# Patient Record
Sex: FEMALE | Race: WHITE | NOT HISPANIC OR LATINO | URBAN - METROPOLITAN AREA
[De-identification: names, ages, dates, MRNs, and addresses within clinical notes are randomized per-mention and may not be internally consistent; named-entity substitution may affect disease eponyms.]

---

## 2018-05-11 ENCOUNTER — COMMUNICATION - HEALTHEAST (OUTPATIENT)
Dept: TELEHEALTH | Facility: CLINIC | Age: 28
End: 2018-05-11

## 2018-05-11 ENCOUNTER — OFFICE VISIT - HEALTHEAST (OUTPATIENT)
Dept: FAMILY MEDICINE | Facility: CLINIC | Age: 28
End: 2018-05-11

## 2018-05-11 DIAGNOSIS — F41.9 ANXIETY: ICD-10-CM

## 2018-05-11 DIAGNOSIS — N89.8 VAGINAL DISCHARGE: ICD-10-CM

## 2018-05-11 DIAGNOSIS — R03.0 ELEVATED BLOOD-PRESSURE READING WITHOUT DIAGNOSIS OF HYPERTENSION: ICD-10-CM

## 2018-05-11 DIAGNOSIS — L70.9 ACNE: ICD-10-CM

## 2018-05-11 DIAGNOSIS — Z00.00 HEALTH CARE MAINTENANCE: ICD-10-CM

## 2018-05-11 LAB
ALBUMIN SERPL-MCNC: 4.3 G/DL (ref 3.5–5)
ALP SERPL-CCNC: 44 U/L (ref 45–120)
ALT SERPL W P-5'-P-CCNC: <9 U/L (ref 0–45)
ANION GAP SERPL CALCULATED.3IONS-SCNC: 13 MMOL/L (ref 5–18)
AST SERPL W P-5'-P-CCNC: 14 U/L (ref 0–40)
BILIRUB SERPL-MCNC: 0.9 MG/DL (ref 0–1)
BUN SERPL-MCNC: 13 MG/DL (ref 8–22)
CALCIUM SERPL-MCNC: 9.8 MG/DL (ref 8.5–10.5)
CHLORIDE BLD-SCNC: 102 MMOL/L (ref 98–107)
CHOLEST SERPL-MCNC: 189 MG/DL
CLUE CELLS: NORMAL
CO2 SERPL-SCNC: 21 MMOL/L (ref 22–31)
CREAT SERPL-MCNC: 0.87 MG/DL (ref 0.6–1.1)
ERYTHROCYTE [DISTWIDTH] IN BLOOD BY AUTOMATED COUNT: 10.9 % (ref 11–14.5)
FASTING STATUS PATIENT QL REPORTED: YES
GFR SERPL CREATININE-BSD FRML MDRD: >60 ML/MIN/1.73M2
GLUCOSE BLD-MCNC: 86 MG/DL (ref 70–125)
HCT VFR BLD AUTO: 45.6 % (ref 35–47)
HDLC SERPL-MCNC: 67 MG/DL
HGB BLD-MCNC: 16.1 G/DL (ref 12–16)
LDLC SERPL CALC-MCNC: 100 MG/DL
MCH RBC QN AUTO: 31.8 PG (ref 27–34)
MCHC RBC AUTO-ENTMCNC: 35.4 G/DL (ref 32–36)
MCV RBC AUTO: 90 FL (ref 80–100)
PLATELET # BLD AUTO: 299 THOU/UL (ref 140–440)
PMV BLD AUTO: 7 FL (ref 7–10)
POTASSIUM BLD-SCNC: 4.3 MMOL/L (ref 3.5–5)
PROT SERPL-MCNC: 7.8 G/DL (ref 6–8)
RBC # BLD AUTO: 5.08 MILL/UL (ref 3.8–5.4)
SODIUM SERPL-SCNC: 136 MMOL/L (ref 136–145)
TRICHOMONAS, WET PREP: NORMAL
TRIGL SERPL-MCNC: 111 MG/DL
TSH SERPL DL<=0.005 MIU/L-ACNC: 2.75 UIU/ML (ref 0.3–5)
WBC: 5.3 THOU/UL (ref 4–11)
YEAST, WET PREP: NORMAL

## 2018-05-11 ASSESSMENT — MIFFLIN-ST. JEOR: SCORE: 1297.41

## 2018-05-14 LAB
25(OH)D3 SERPL-MCNC: 39.2 NG/ML (ref 30–80)
25(OH)D3 SERPL-MCNC: 39.2 NG/ML (ref 30–80)
C TRACH DNA SPEC QL PROBE+SIG AMP: NEGATIVE
N GONORRHOEA DNA SPEC QL NAA+PROBE: NEGATIVE

## 2018-06-19 ENCOUNTER — COMMUNICATION - HEALTHEAST (OUTPATIENT)
Dept: FAMILY MEDICINE | Facility: CLINIC | Age: 28
End: 2018-06-19

## 2018-06-20 ENCOUNTER — AMBULATORY - HEALTHEAST (OUTPATIENT)
Dept: FAMILY MEDICINE | Facility: CLINIC | Age: 28
End: 2018-06-20

## 2018-06-20 DIAGNOSIS — Z30.9 CONTRACEPTIVE MANAGEMENT: ICD-10-CM

## 2018-07-10 ENCOUNTER — COMMUNICATION - HEALTHEAST (OUTPATIENT)
Dept: ADMINISTRATIVE | Facility: CLINIC | Age: 28
End: 2018-07-10

## 2019-05-30 ENCOUNTER — COMMUNICATION - HEALTHEAST (OUTPATIENT)
Dept: FAMILY MEDICINE | Facility: CLINIC | Age: 29
End: 2019-05-30

## 2019-05-30 DIAGNOSIS — Z30.9 CONTRACEPTIVE MANAGEMENT: ICD-10-CM

## 2019-06-18 ENCOUNTER — OFFICE VISIT (OUTPATIENT)
Dept: FAMILY MEDICINE | Facility: CLINIC | Age: 29
End: 2019-06-18
Payer: COMMERCIAL

## 2019-06-18 VITALS — SYSTOLIC BLOOD PRESSURE: 133 MMHG | TEMPERATURE: 98.1 F | DIASTOLIC BLOOD PRESSURE: 85 MMHG

## 2019-06-18 DIAGNOSIS — Z71.84 TRAVEL ADVICE ENCOUNTER: Primary | ICD-10-CM

## 2019-06-18 DIAGNOSIS — Z23 NEED FOR VACCINATION: ICD-10-CM

## 2019-06-18 PROCEDURE — 90632 HEPA VACCINE ADULT IM: CPT | Mod: GA | Performed by: FAMILY MEDICINE

## 2019-06-18 PROCEDURE — 90471 IMMUNIZATION ADMIN: CPT | Mod: GA | Performed by: FAMILY MEDICINE

## 2019-06-18 PROCEDURE — 90472 IMMUNIZATION ADMIN EACH ADD: CPT | Mod: GA | Performed by: FAMILY MEDICINE

## 2019-06-18 PROCEDURE — 99402 PREV MED CNSL INDIV APPRX 30: CPT | Mod: 25 | Performed by: FAMILY MEDICINE

## 2019-06-18 PROCEDURE — 90691 TYPHOID VACCINE IM: CPT | Mod: GA | Performed by: FAMILY MEDICINE

## 2019-06-18 RX ORDER — DROSPIRENONE AND ETHINYL ESTRADIOL 0.02-3(28)
KIT ORAL
COMMUNITY
Start: 2019-06-03

## 2019-06-18 RX ORDER — AZITHROMYCIN 500 MG/1
TABLET, FILM COATED ORAL
Qty: 3 TABLET | Refills: 0 | Status: SHIPPED | OUTPATIENT
Start: 2019-06-18

## 2019-06-18 RX ORDER — ATOVAQUONE AND PROGUANIL HYDROCHLORIDE 250; 100 MG/1; MG/1
1 TABLET, FILM COATED ORAL DAILY
Qty: 19 TABLET | Refills: 0 | Status: SHIPPED | OUTPATIENT
Start: 2019-06-18

## 2019-06-18 NOTE — PROGRESS NOTES
Itinerary:  [facility]     Departure Date: 6/21/19    Return Date: 7/1/19    Length of Trip: 10 days    Purpose of Trip: pleasure     Urban/Rural: both    Accommodations: family home     IMMUNIZATION HISTORY  Have you received any immunizations within the past 4 weeks?  No  Have you ever fainted from having your blood drawn or from an injection?  No  Have you ever had a fever reaction to vaccination?  No  Have you ever had any bad reaction or side effect from any vaccination?  No  Have you ever had hepatitis A or B vaccine?  Yes  Do you live (or work closely) with anyone who has AIDS, an AIDS-like condition, any other immune disorder or who is on chemotherapy for cancer or a   family history of immunodeficiency?  No  Have you received any injection of immune globulin or any blood products during the past 12 months?  No    Patient roomed by DARIN Matos     Special medical concerns: none    /85   Temp 98.1  F (36.7  C) (Oral)   LMP 06/13/2019 (Approximate)   EXAM: deferred    Immunizations discussed include: Hepatitis A and Typhoid  Malaraia prophylaxis recommended: Malarone  Symptomatic treatment for traveler's diarrhea: azithromycin    ASSESSMENT/PLAN:    ICD-10-CM    1. Travel advice encounter Z71.89 azithromycin (ZITHROMAX) 500 MG tablet     atovaquone-proguanil (MALARONE) 250-100 MG tablet   2. Need for vaccination Z23 HEPA VACCINE ADULT IM     TYPHOID VACCINE, IM     ADMIN 1st VACCINE     EA ADD'L VACCINE     I have reviewed general recommendations for safe travel   including: food/water precautions, insect avoidance, safe sex   practices given high prevalence of HIV and other STDs,   roadway safety. Educational materials and [employer] report provided.    Total visit time 30 minutes with over 50% of time spent counseling patient.

## 2019-08-05 ENCOUNTER — COMMUNICATION - HEALTHEAST (OUTPATIENT)
Dept: FAMILY MEDICINE | Facility: CLINIC | Age: 29
End: 2019-08-05

## 2019-08-05 DIAGNOSIS — Z30.9 CONTRACEPTIVE MANAGEMENT: ICD-10-CM

## 2019-08-06 ENCOUNTER — COMMUNICATION - HEALTHEAST (OUTPATIENT)
Dept: FAMILY MEDICINE | Facility: CLINIC | Age: 29
End: 2019-08-06

## 2019-08-06 DIAGNOSIS — Z30.9 CONTRACEPTIVE MANAGEMENT: ICD-10-CM

## 2019-08-28 ENCOUNTER — OFFICE VISIT - HEALTHEAST (OUTPATIENT)
Dept: FAMILY MEDICINE | Facility: CLINIC | Age: 29
End: 2019-08-28

## 2019-08-28 DIAGNOSIS — F41.9 ANXIETY: ICD-10-CM

## 2019-08-28 DIAGNOSIS — Z12.4 SCREENING FOR CERVICAL CANCER: ICD-10-CM

## 2019-08-28 DIAGNOSIS — Z00.00 ROUTINE GENERAL MEDICAL EXAMINATION AT A HEALTH CARE FACILITY: ICD-10-CM

## 2019-08-28 DIAGNOSIS — G25.81 RESTLESS LEG SYNDROME: ICD-10-CM

## 2019-08-28 DIAGNOSIS — Z30.09 GENERAL COUNSELING FOR PRESCRIPTION OF ORAL CONTRACEPTIVES: ICD-10-CM

## 2019-08-28 LAB
ANION GAP SERPL CALCULATED.3IONS-SCNC: 11 MMOL/L (ref 5–18)
BUN SERPL-MCNC: 11 MG/DL (ref 8–22)
CALCIUM SERPL-MCNC: 9.9 MG/DL (ref 8.5–10.5)
CHLORIDE BLD-SCNC: 104 MMOL/L (ref 98–107)
CHOLEST SERPL-MCNC: 189 MG/DL
CO2 SERPL-SCNC: 23 MMOL/L (ref 22–31)
CREAT SERPL-MCNC: 0.83 MG/DL (ref 0.6–1.1)
ERYTHROCYTE [DISTWIDTH] IN BLOOD BY AUTOMATED COUNT: 10.2 % (ref 11–14.5)
FASTING STATUS PATIENT QL REPORTED: YES
FERRITIN SERPL-MCNC: 73 NG/ML (ref 10–130)
GFR SERPL CREATININE-BSD FRML MDRD: >60 ML/MIN/1.73M2
GLUCOSE BLD-MCNC: 83 MG/DL (ref 70–125)
HCT VFR BLD AUTO: 42.9 % (ref 35–47)
HDLC SERPL-MCNC: 68 MG/DL
HGB BLD-MCNC: 15.2 G/DL (ref 12–16)
HIV 1+2 AB+HIV1 P24 AG SERPL QL IA: NEGATIVE
LDLC SERPL CALC-MCNC: 104 MG/DL
MCH RBC QN AUTO: 31.6 PG (ref 27–34)
MCHC RBC AUTO-ENTMCNC: 35.4 G/DL (ref 32–36)
MCV RBC AUTO: 89 FL (ref 80–100)
PLATELET # BLD AUTO: 336 THOU/UL (ref 140–440)
PMV BLD AUTO: 6.7 FL (ref 7–10)
POTASSIUM BLD-SCNC: 4.3 MMOL/L (ref 3.5–5)
RBC # BLD AUTO: 4.8 MILL/UL (ref 3.8–5.4)
SODIUM SERPL-SCNC: 138 MMOL/L (ref 136–145)
TRIGL SERPL-MCNC: 83 MG/DL
TSH SERPL DL<=0.005 MIU/L-ACNC: 2.41 UIU/ML (ref 0.3–5)
WBC: 5.6 THOU/UL (ref 4–11)

## 2019-08-28 ASSESSMENT — MIFFLIN-ST. JEOR: SCORE: 1330.52

## 2019-08-29 LAB
BKR LAB AP ABNORMAL BLEEDING: NO
BKR LAB AP BIRTH CONTROL/HORMONES: NORMAL
BKR LAB AP CERVICAL APPEARANCE: NORMAL
BKR LAB AP GYN ADEQUACY: NORMAL
BKR LAB AP GYN INTERPRETATION: NORMAL
BKR LAB AP GYN OTHER FINDINGS: NORMAL
BKR LAB AP HPV REFLEX: NORMAL
BKR LAB AP LMP: NORMAL
BKR LAB AP PATIENT STATUS: NO
BKR LAB AP PREVIOUS ABNORMAL: NORMAL
BKR LAB AP PREVIOUS NORMAL: NORMAL
C TRACH DNA SPEC QL PROBE+SIG AMP: NEGATIVE
HIGH RISK?: NO
N GONORRHOEA DNA SPEC QL NAA+PROBE: NEGATIVE
PATH REPORT.COMMENTS IMP SPEC: NORMAL
RESULT FLAG (HE HISTORICAL CONVERSION): NORMAL

## 2019-11-27 ENCOUNTER — COMMUNICATION - HEALTHEAST (OUTPATIENT)
Dept: FAMILY MEDICINE | Facility: CLINIC | Age: 29
End: 2019-11-27

## 2020-01-09 ENCOUNTER — OFFICE VISIT - HEALTHEAST (OUTPATIENT)
Dept: FAMILY MEDICINE | Facility: CLINIC | Age: 30
End: 2020-01-09

## 2020-01-09 DIAGNOSIS — Z30.09 GENERAL COUNSELING FOR PRESCRIPTION OF ORAL CONTRACEPTIVES: ICD-10-CM

## 2020-03-10 ENCOUNTER — HEALTH MAINTENANCE LETTER (OUTPATIENT)
Age: 30
End: 2020-03-10

## 2020-05-10 ENCOUNTER — OFFICE VISIT - HEALTHEAST (OUTPATIENT)
Dept: FAMILY MEDICINE | Facility: CLINIC | Age: 30
End: 2020-05-10

## 2020-05-10 DIAGNOSIS — N39.0 ACUTE UTI (URINARY TRACT INFECTION): ICD-10-CM

## 2020-05-11 ENCOUNTER — COMMUNICATION - HEALTHEAST (OUTPATIENT)
Dept: FAMILY MEDICINE | Facility: CLINIC | Age: 30
End: 2020-05-11

## 2020-08-17 ENCOUNTER — COMMUNICATION - HEALTHEAST (OUTPATIENT)
Dept: FAMILY MEDICINE | Facility: CLINIC | Age: 30
End: 2020-08-17

## 2020-08-20 ENCOUNTER — OFFICE VISIT - HEALTHEAST (OUTPATIENT)
Dept: FAMILY MEDICINE | Facility: CLINIC | Age: 30
End: 2020-08-20

## 2020-08-20 ENCOUNTER — AMBULATORY - HEALTHEAST (OUTPATIENT)
Dept: NURSING | Facility: CLINIC | Age: 30
End: 2020-08-20

## 2020-08-20 DIAGNOSIS — J03.90 TONSILLITIS: ICD-10-CM

## 2020-08-20 DIAGNOSIS — Z20.822 EXPOSURE TO COVID-19 VIRUS: ICD-10-CM

## 2020-08-20 DIAGNOSIS — J02.9 SORE THROAT: ICD-10-CM

## 2020-08-20 DIAGNOSIS — Z20.822 SUSPECTED 2019 NOVEL CORONAVIRUS INFECTION: ICD-10-CM

## 2020-08-20 DIAGNOSIS — G44.209 TENSION HEADACHE: ICD-10-CM

## 2020-08-20 LAB — DEPRECATED S PYO AG THROAT QL EIA: NORMAL

## 2020-08-21 ENCOUNTER — COMMUNICATION - HEALTHEAST (OUTPATIENT)
Dept: FAMILY MEDICINE | Facility: CLINIC | Age: 30
End: 2020-08-21

## 2020-08-21 LAB — GROUP A STREP BY PCR: NORMAL

## 2020-08-24 ENCOUNTER — AMBULATORY - HEALTHEAST (OUTPATIENT)
Dept: FAMILY MEDICINE | Facility: CLINIC | Age: 30
End: 2020-08-24

## 2020-08-24 DIAGNOSIS — Z20.822 SUSPECTED 2019 NOVEL CORONAVIRUS INFECTION: ICD-10-CM

## 2020-08-24 DIAGNOSIS — J02.9 SORE THROAT: ICD-10-CM

## 2020-08-25 ENCOUNTER — COMMUNICATION - HEALTHEAST (OUTPATIENT)
Dept: FAMILY MEDICINE | Facility: CLINIC | Age: 30
End: 2020-08-25

## 2020-08-26 ENCOUNTER — COMMUNICATION - HEALTHEAST (OUTPATIENT)
Dept: SCHEDULING | Facility: CLINIC | Age: 30
End: 2020-08-26

## 2020-11-16 ENCOUNTER — OFFICE VISIT - HEALTHEAST (OUTPATIENT)
Dept: FAMILY MEDICINE | Facility: CLINIC | Age: 30
End: 2020-11-16

## 2020-11-16 DIAGNOSIS — F41.8 SITUATIONAL ANXIETY: ICD-10-CM

## 2020-11-16 DIAGNOSIS — Z00.00 ROUTINE GENERAL MEDICAL EXAMINATION AT A HEALTH CARE FACILITY: ICD-10-CM

## 2020-11-16 DIAGNOSIS — L84 CORN OR CALLUS: ICD-10-CM

## 2020-11-16 LAB
CHOLEST SERPL-MCNC: 206 MG/DL
ERYTHROCYTE [DISTWIDTH] IN BLOOD BY AUTOMATED COUNT: 10 % (ref 11–14.5)
FASTING STATUS PATIENT QL REPORTED: ABNORMAL
HBA1C MFR BLD: 4.4 %
HCT VFR BLD AUTO: 42.7 % (ref 35–47)
HDLC SERPL-MCNC: 66 MG/DL
HGB BLD-MCNC: 14.9 G/DL (ref 12–16)
LDLC SERPL CALC-MCNC: 110 MG/DL
MCH RBC QN AUTO: 31.9 PG (ref 27–34)
MCHC RBC AUTO-ENTMCNC: 34.9 G/DL (ref 32–36)
MCV RBC AUTO: 91 FL (ref 80–100)
PLATELET # BLD AUTO: 374 THOU/UL (ref 140–440)
PMV BLD AUTO: 6.4 FL (ref 7–10)
RBC # BLD AUTO: 4.67 MILL/UL (ref 3.8–5.4)
TRIGL SERPL-MCNC: 148 MG/DL
WBC: 5.9 THOU/UL (ref 4–11)

## 2020-11-16 ASSESSMENT — ANXIETY QUESTIONNAIRES
GAD7 TOTAL SCORE: 11
1. FEELING NERVOUS, ANXIOUS, OR ON EDGE: MORE THAN HALF THE DAYS
6. BECOMING EASILY ANNOYED OR IRRITABLE: SEVERAL DAYS
3. WORRYING TOO MUCH ABOUT DIFFERENT THINGS: MORE THAN HALF THE DAYS
2. NOT BEING ABLE TO STOP OR CONTROL WORRYING: SEVERAL DAYS
IF YOU CHECKED OFF ANY PROBLEMS ON THIS QUESTIONNAIRE, HOW DIFFICULT HAVE THESE PROBLEMS MADE IT FOR YOU TO DO YOUR WORK, TAKE CARE OF THINGS AT HOME, OR GET ALONG WITH OTHER PEOPLE: SOMEWHAT DIFFICULT
5. BEING SO RESTLESS THAT IT IS HARD TO SIT STILL: MORE THAN HALF THE DAYS
4. TROUBLE RELAXING: MORE THAN HALF THE DAYS
7. FEELING AFRAID AS IF SOMETHING AWFUL MIGHT HAPPEN: SEVERAL DAYS

## 2020-11-16 ASSESSMENT — MIFFLIN-ST. JEOR: SCORE: 1350.6

## 2020-11-16 ASSESSMENT — PATIENT HEALTH QUESTIONNAIRE - PHQ9: SUM OF ALL RESPONSES TO PHQ QUESTIONS 1-9: 6

## 2020-12-09 ENCOUNTER — COMMUNICATION - HEALTHEAST (OUTPATIENT)
Dept: FAMILY MEDICINE | Facility: CLINIC | Age: 30
End: 2020-12-09

## 2020-12-09 DIAGNOSIS — F41.8 SITUATIONAL ANXIETY: ICD-10-CM

## 2020-12-22 ENCOUNTER — OFFICE VISIT - HEALTHEAST (OUTPATIENT)
Dept: FAMILY MEDICINE | Facility: CLINIC | Age: 30
End: 2020-12-22

## 2020-12-22 DIAGNOSIS — F41.8 SITUATIONAL ANXIETY: ICD-10-CM

## 2020-12-22 DIAGNOSIS — Z11.1 SCREENING EXAMINATION FOR PULMONARY TUBERCULOSIS: ICD-10-CM

## 2020-12-22 ASSESSMENT — PATIENT HEALTH QUESTIONNAIRE - PHQ9: SUM OF ALL RESPONSES TO PHQ QUESTIONS 1-9: 3

## 2020-12-22 ASSESSMENT — ANXIETY QUESTIONNAIRES
6. BECOMING EASILY ANNOYED OR IRRITABLE: MORE THAN HALF THE DAYS
7. FEELING AFRAID AS IF SOMETHING AWFUL MIGHT HAPPEN: NOT AT ALL
2. NOT BEING ABLE TO STOP OR CONTROL WORRYING: SEVERAL DAYS
4. TROUBLE RELAXING: SEVERAL DAYS
3. WORRYING TOO MUCH ABOUT DIFFERENT THINGS: NEARLY EVERY DAY
GAD7 TOTAL SCORE: 11
1. FEELING NERVOUS, ANXIOUS, OR ON EDGE: NEARLY EVERY DAY
5. BEING SO RESTLESS THAT IT IS HARD TO SIT STILL: SEVERAL DAYS

## 2020-12-24 LAB
GAMMA INTERFERON BACKGROUND BLD IA-ACNC: 0.07 IU/ML
M TB IFN-G BLD-IMP: NEGATIVE
MITOGEN IGNF BCKGRD COR BLD-ACNC: -0.01 IU/ML
MITOGEN IGNF BCKGRD COR BLD-ACNC: 0 IU/ML
QTF INTERPRETATION: NORMAL
QTF MITOGEN - NIL: >10 IU/ML

## 2020-12-27 ENCOUNTER — HEALTH MAINTENANCE LETTER (OUTPATIENT)
Age: 30
End: 2020-12-27

## 2021-01-26 ENCOUNTER — COMMUNICATION - HEALTHEAST (OUTPATIENT)
Dept: SCHEDULING | Facility: CLINIC | Age: 31
End: 2021-01-26

## 2021-01-27 ENCOUNTER — OFFICE VISIT - HEALTHEAST (OUTPATIENT)
Dept: FAMILY MEDICINE | Facility: CLINIC | Age: 31
End: 2021-01-27

## 2021-01-27 ENCOUNTER — COMMUNICATION - HEALTHEAST (OUTPATIENT)
Dept: FAMILY MEDICINE | Facility: CLINIC | Age: 31
End: 2021-01-27

## 2021-01-27 DIAGNOSIS — Z30.09 GENERAL COUNSELING FOR PRESCRIPTION OF ORAL CONTRACEPTIVES: ICD-10-CM

## 2021-01-27 DIAGNOSIS — F41.8 SITUATIONAL ANXIETY: ICD-10-CM

## 2021-01-27 ASSESSMENT — ANXIETY QUESTIONNAIRES
1. FEELING NERVOUS, ANXIOUS, OR ON EDGE: MORE THAN HALF THE DAYS
GAD7 TOTAL SCORE: 10
4. TROUBLE RELAXING: MORE THAN HALF THE DAYS
3. WORRYING TOO MUCH ABOUT DIFFERENT THINGS: MORE THAN HALF THE DAYS
6. BECOMING EASILY ANNOYED OR IRRITABLE: SEVERAL DAYS
IF YOU CHECKED OFF ANY PROBLEMS ON THIS QUESTIONNAIRE, HOW DIFFICULT HAVE THESE PROBLEMS MADE IT FOR YOU TO DO YOUR WORK, TAKE CARE OF THINGS AT HOME, OR GET ALONG WITH OTHER PEOPLE: SOMEWHAT DIFFICULT
7. FEELING AFRAID AS IF SOMETHING AWFUL MIGHT HAPPEN: NOT AT ALL
2. NOT BEING ABLE TO STOP OR CONTROL WORRYING: MORE THAN HALF THE DAYS
5. BEING SO RESTLESS THAT IT IS HARD TO SIT STILL: SEVERAL DAYS

## 2021-01-27 ASSESSMENT — PATIENT HEALTH QUESTIONNAIRE - PHQ9: SUM OF ALL RESPONSES TO PHQ QUESTIONS 1-9: 6

## 2021-02-20 ENCOUNTER — COMMUNICATION - HEALTHEAST (OUTPATIENT)
Dept: FAMILY MEDICINE | Facility: CLINIC | Age: 31
End: 2021-02-20

## 2021-02-20 DIAGNOSIS — F41.8 SITUATIONAL ANXIETY: ICD-10-CM

## 2021-04-24 ENCOUNTER — HEALTH MAINTENANCE LETTER (OUTPATIENT)
Age: 31
End: 2021-04-24

## 2021-05-26 ASSESSMENT — PATIENT HEALTH QUESTIONNAIRE - PHQ9
SUM OF ALL RESPONSES TO PHQ QUESTIONS 1-9: 6
SUM OF ALL RESPONSES TO PHQ QUESTIONS 1-9: 3

## 2021-05-27 ASSESSMENT — PATIENT HEALTH QUESTIONNAIRE - PHQ9: SUM OF ALL RESPONSES TO PHQ QUESTIONS 1-9: 6

## 2021-05-28 ASSESSMENT — ANXIETY QUESTIONNAIRES
GAD7 TOTAL SCORE: 11
GAD7 TOTAL SCORE: 11
GAD7 TOTAL SCORE: 10

## 2021-05-29 NOTE — TELEPHONE ENCOUNTER
RN cannot approve Refill Request    RN can NOT refill this medication overdue for office visits and/or labs.    Laith Helay, [facility] Triage/Med Refill 5/30/2019    Requested Prescriptions   Pending Prescriptions Disp Refills     GIANVI, 28, 3-0.02 mg per tablet [Pharmacy Med Name: GIANVI 3 MG-0.02 MG TABLET] 84 tablet 3     Sig: TAKE 1 TABLET BY MOUTH EVERY DAY       Oral Contraceptives Protocol Failed - 5/30/2019  1:13 AM        Failed - Visit with PCP or prescribing provider visit in last 12 months      Last office visit with prescriber/PCP: Visit date not found OR same dept: Visit date not found OR same specialty: Visit date not found  Last physical: 5/11/2018 Last MTM visit: Visit date not found   Next visit within 3 mo: Visit date not found  Next physical within 3 mo: Visit date not found  Prescriber OR PCP: Coty Santamaria NP  Last diagnosis associated with med order: 1. Contraceptive management  - GIANVI, 28, 3-0.02 mg per tablet [Pharmacy Med Name: GIANVI 3 MG-0.02 MG TABLET]; TAKE 1 TABLET BY MOUTH EVERY DAY  Dispense: 84 tablet; Refill: 3    If protocol passes may refill for 12 months if within 3 months of last provider visit (or a total of 15 months).

## 2021-05-31 NOTE — TELEPHONE ENCOUNTER
RN cannot approve Refill Request    RN can NOT refill this medication PCP messaged that patient is overdue for Office Visit. Last office visit: 5/11/18    Vidhya Harry, [facility] Triage/Med Refill 8/7/2019    Requested Prescriptions   Pending Prescriptions Disp Refills     drospirenone-ethinyl estradiol (GIANVI, 28,) 3-0.02 mg per tablet 84 tablet 0     Sig: Take 1 tablet by mouth daily.       Oral Contraceptives Protocol Failed - 8/6/2019  7:52 PM        Failed - Visit with PCP or prescribing provider visit in last 12 months      Last office visit with prescriber/PCP: Visit date not found OR same dept: Visit date not found OR same specialty: Visit date not found  Last physical: Visit date not found Last MTM visit: Visit date not found   Next visit within 3 mo: Visit date not found  Next physical within 3 mo: Visit date not found  Prescriber OR PCP: Susanna Colby MD  Last diagnosis associated with med order: 1. Contraceptive management  - drospirenone-ethinyl estradiol (GIANVI, 28,) 3-0.02 mg per tablet; Take 1 tablet by mouth daily.  Dispense: 84 tablet; Refill: 0    If protocol passes may refill for 12 months if within 3 months of last provider visit (or a total of 15 months).

## 2021-05-31 NOTE — TELEPHONE ENCOUNTER
Refill request for medication: 8/5/19  Last visit addressing this medication: 5/2018  Follow up plan 12  months  Last refill on 6/3/19, quantity 84   CSA completed NA    checked  08/07/19, last dispensed refill NA     Appointment: Appointment scheduled for [facility] est care with Dr. Colby 8/28/19     Rupa Rosales, A

## 2021-05-31 NOTE — TELEPHONE ENCOUNTER
Former patient of Coty Santamaria & has not established care with another provider.  Please assign refill request to covering provider per Clinic standard process.    RN cannot approve Refill Request    RN can NOT refill this medication Protocol failed and NO refill given. Last office visit: Visit date not found Last Physical: Visit date not found Last MTM visit: Visit date not found Last visit same specialty: Visit date not found.  Next visit within 3 mo: Visit date not found  Next physical within 3 mo: Visit date not found      Jackie Alvarez, [facility] Connection Triage/Med Refill 8/6/2019    Requested Prescriptions   Pending Prescriptions Disp Refills     drospirenone-ethinyl estradiol (GIANVI, 28,) 3-0.02 mg per tablet 84 tablet 0     Sig: Take 1 tablet by mouth daily.       Oral Contraceptives Protocol Failed - 8/5/2019  7:45 PM        Failed - Visit with PCP or prescribing provider visit in last 12 months      Last office visit with prescriber/PCP: Visit date not found OR same dept: Visit date not found OR same specialty: Visit date not found  Last physical: Visit date not found Last MTM visit: Visit date not found   Next visit within 3 mo: Visit date not found  Next physical within 3 mo: Visit date not found  Prescriber OR PCP: Susanna Colby MD  Last diagnosis associated with med order: 1. Contraceptive management  - drospirenone-ethinyl estradiol (GIANVI, 28,) 3-0.02 mg per tablet; Take 1 tablet by mouth daily.  Dispense: 84 tablet; Refill: 0    If protocol passes may refill for 12 months if within 3 months of last provider visit (or a total of 15 months).

## 2021-05-31 NOTE — PROGRESS NOTES
Your pap results are normal. There were some signs of a yeast infection. If this is bothering you with discharge/itching, you can try Monostat over the counter or we can send in fluconazole pill.     We recommend that this pap test should be repeated in 3 years. If you have any questions please call our clinic.

## 2021-06-01 VITALS — WEIGHT: 126 LBS | HEIGHT: 65 IN | BODY MASS INDEX: 20.99 KG/M2

## 2021-06-03 VITALS — HEIGHT: 65 IN | BODY MASS INDEX: 22.21 KG/M2 | WEIGHT: 133.3 LBS

## 2021-06-03 NOTE — TELEPHONE ENCOUNTER
Reached out to patient and left a message to return phone call. Please relay the below message, and assist patient with scheduling an appointment. Thank you,  Cheyenne Perdomo

## 2021-06-04 VITALS
WEIGHT: 138.6 LBS | SYSTOLIC BLOOD PRESSURE: 120 MMHG | HEIGHT: 65 IN | BODY MASS INDEX: 23.09 KG/M2 | HEART RATE: 76 BPM | DIASTOLIC BLOOD PRESSURE: 84 MMHG

## 2021-06-04 VITALS
BODY MASS INDEX: 22.35 KG/M2 | WEIGHT: 134.3 LBS | DIASTOLIC BLOOD PRESSURE: 78 MMHG | SYSTOLIC BLOOD PRESSURE: 114 MMHG | HEART RATE: 68 BPM

## 2021-06-04 NOTE — TELEPHONE ENCOUNTER
Patient Returning Call  Reason for call:  BCP  Information relayed to patient:  The patient was calling for update on the message.  She forgot CMT called . The writer read the following to patient per Dr Cotton: Keyanna Ryan.  I am covering for Dr Colby who is out of the clinic for the remainder of the week.  May I ask you to schedule an appointment to discuss these concerns with her please.  Thank you.  Have a great holiday.   And then The writer read the following to patient per Dr Colby  Agree with Dr. Cotton; please have pt schedule office visit. There's possibility it is related to the birth control pill but has tolerated it for some time so we might need to delve into why.  Patient has additional questions:  Yes  If YES, what are your questions/concerns:  She is in agreement with appointment.  She wanted Dr Colby to know that she stopped taking bcp after the last migriane iand has had no headaches since. She is using condoms as protection at this time. Transferred to scheduling.   Okay to leave a detailed message?: No

## 2021-06-05 VITALS
WEIGHT: 140 LBS | DIASTOLIC BLOOD PRESSURE: 80 MMHG | BODY MASS INDEX: 23.48 KG/M2 | SYSTOLIC BLOOD PRESSURE: 122 MMHG | HEART RATE: 80 BPM

## 2021-06-05 NOTE — PROGRESS NOTES
Assessment/plan   Shelby Colunga is a 29 y.o. female who is established to my practice at one prior visit.    Shelby was seen today for follow-up.    Diagnoses and all orders for this visit:    General counseling for prescription of oral contraceptives  She previously tolerated Gianvi combined oral contraceptive for many years, but subsequently developed frequent tension type headaches.  She did have 1 migraine without aura but she felt this was related to caffeine withdrawal.  We discussed at length her news to me about elevated blood pressures intermittently which was seemingly regional anxiety.  Given both of these conditions I have carefully reviewed side effect profiles of contraception options and she really prefers to stay with the pill understanding of the risks.  She would like to trial a different progesterone component to see if this causes headaches or not.  Given lack of migraines on a regular basis and no aura, with normal blood pressure this is a reasonable approach.  She was given information about nonhormonal option as well.  I have asked her to check blood pressures for the next couple weeks before starting this birth control pill.  She is currently using condoms, withdrawal and abstinence for protection.   -     norgestimate-ethinyl estradiol (SPRINTEC, 28,) 0.25-35 mg-mcg per tablet; Take 1 tablet by mouth daily.      Follow up: by [facility] with update in 2-4 weeks    Susanna Colby MD    Subjective:      HPI: Shelby Colunga is a 29 y.o. female who is here for:    Chief Complaint   Patient presents with     Follow-up     would like to discuss new birth control- thinks last birth control was contributing to headaches       Discuss OCP/headaches:   She had previously been on an OCP, Gianvi for several years but stopped this around late November 2019 due to concern for frequent tension type headaches.  In the preceding 2 to 3 months she was having frequent headaches 2 to 3/week despite drinking  "plenty of water and caffeine throughout the day. She did have her vision checked- prescription was slightly off. She was seeing a chiropractor and had decent sleep and eating well.  She was monitoring blood pressures and was 140s/80s at work.  She then had more of a migraine and had light sensitivity, nausea, vomiting with these headaches.  No aura.  Migraine headaches subsided after stopping this medication for at least one month.  In the meantime she was using condoms for protection.  Her LMP was 12/25/2019.    Only had 2-3 headaches in the past month.  Feels really good about this.    She describes a hx of having issues with high blood pressures in the 150s/90s, but felt she was under stress/situational anxiety and these are improved now. Doing more self cares (rest, relaxation, eating well, exercising). BPs at work have been 140/80s when she was having headaches but she thought that was related to pain.  She used to be on propranolol for situational anxiety but states this was back when she was in college, though notes from PCP in 2018 indicate she was still using it intermittently.    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 Working at [employer], also a [employer] 1x/week (\"good for my soul job\").    Currently in a relationship, exercises periodically.  Social alcohol use.  Never smoker.    Susanna Colby MD       Medical History:  Patient Active Problem List   Diagnosis     Acne     Anxiety     Restless leg syndrome     Past Medical History:   Diagnosis Date     Anxiety      Past Surgical History:   Procedure Laterality Date     HIP SURGERY       WISDOM TOOTH EXTRACTION       Patient has no known " allergies.  Family History   Problem Relation Age of Onset     Hypertension Mother      Rheum arthritis Mother      Atrial fibrillation Father        Medications:  Current Outpatient Medications   Medication Sig Dispense Refill     norgestimate-ethinyl estradiol (SPRINTEC, 28,) 0.25-35 mg-mcg per tablet Take 1 tablet by mouth daily. 3 Package 4     No current facility-administered medications for this visit.        Imaging & Labs reviewed this visit: none      Objective:      Vitals:    01/09/20 1226   BP: 114/78   Pulse: 68   Weight: 134 lb 4.8 oz (60.9 kg)       Physical Exam:     General: Alert, no acute distress.   HEENT: normocephalic conjunctivae are clear. EOMI- fundi benign.   Neck: supple   Lungs: Normal effort  Heart: regular rate  Abdomen: soft   Skin: clear without rash or lesions  Neuro: alert, oriented  Psych: mood good, affect appropriate, good eye contact, insight and judgment intact, normal speech pattern.        Susanna Colby MD

## 2021-06-08 NOTE — TELEPHONE ENCOUNTER
Patient Returning Call  Reason for call:  Patient stated she was called an hour ago and is not sure why.  Information relayed to patient:  Patient was informed Mac[facility] to [facility] [address] but writer is not sure why the clinic called her as there is no documentation.  Patient has additional questions:  No  If YES, what are your questions/concerns:  n/a  Okay to leave a detailed message?: Yes    Patient stated to call if anything else is needed otherwise the patient stated she comfortable with the instructions on her after visit summary, for the e-visit from yesterday.

## 2021-06-10 NOTE — TELEPHONE ENCOUNTER
Spoke with Shelby and did speak with Dr. Colby for recommendations after seeing the updated picture of her throat in the [employer] message. Continue antibiotics and gargles as needed, can do warm water and honey, tylenol and ibuprofen to help alleviate symptoms per Dr. Colby.   Tanesha Pagan LPN

## 2021-06-10 NOTE — PROGRESS NOTES
"Shelby Colunga is a 29 y.o. female who is being evaluated via a billable video visit.      The patient has been notified of following:     \"This video visit will be conducted via a call between you and your physician/provider. We have found that certain health care needs can be provided without the need for an in-person physical exam.  This service lets us provide the care you need with a video conversation.  If a prescription is necessary we can send it directly to your pharmacy.  If lab work is needed we can place an order for that and you can then stop by our lab to have the test done at a later time.    Video visits are billed at different rates depending on your insurance coverage. Please reach out to your insurance provider with any questions.    If during the course of the call the physician/provider feels a video visit is not appropriate, you will not be charged for this service.\"    Patient has given verbal consent to a Video visit? Yes  How would you like to obtain your AVS? AVS Preference: [email or web address].  If dropped by the video visit, the video invitation should be sent to: Text to cell phone: [phone]  Will anyone else be joining your video visit? No        Tanesha Pagan LPN    "

## 2021-06-10 NOTE — TELEPHONE ENCOUNTER
kodyb for Shelby to see how she would like the form she faxed to us returned to her.     I will hold on to it at my desk in the mean time.   Tanesha Pagan LPN

## 2021-06-10 NOTE — TELEPHONE ENCOUNTER
Patient Returning Call  Reason for call:  Form  Information relayed to patient:  See DARIA Almendarez's message below  Patient has additional questions:  No  If YES, what are your questions/concerns:  NA  Okay to leave a detailed message?: No call back needed  The patient states to fax to the # provided on the form and a copy to My chart.

## 2021-06-10 NOTE — PROGRESS NOTES
VIDEO VISIT  Due to current COVID19 pandemic, pt was offered virtual visit in lieu of in office evaluation to limit exposures.      Assessment/plan  Shelby Colunga is a 29 y.o. female who is established to my practice.    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  She is remained afebrile without cough or any other respiratory symptoms.  We reviewed the differential which includes strep, tonsillitis, abscess versus possible COVID presentation.  She is going to remain out of work at this time so we can proceed with COVID testing.  Symptomatic care is reviewed for conservative management at this point.  - Rapid Strep A Screen-Throat; Future  - Symptomatic COVID-19 Virus (CORONAVIRUS) PCR; Future    Addendum: She came to the clinic for curbside strep throat swab and I was able to examine the back of her throat which does appear erythematous, mildly edematous posterior oropharynx which is tender and right tonsillar hypertrophy with exudate.  Clinically concerning for strep pharyngitis vs tonsillitis, I will empirically send an antibiotic as I will be out of office later today and await strep test results.    COVID19 precautions reviewed, questions answered. Referred to CDC for latest updates.     Follow up: for COVID & strep testing    Susanna Colby MD    Video-Visit Details- see CA note for consent  Video visit start time: 11:44am  Video visit end time: 12pm  Total time: 16min  Originating Location (pt. Location): Home  Distant Location (provider location):  [facility]   Mode of Communication:  Video Conference via [employer]    Subjective:      HPI: Shelby Colunga is a 29 y.o. female who is being evaluated via a billable video visit due to COVID19 pandemic precautions.    Chief  "Complaint   Patient presents with     Sore Throat     yesterday morning woke up with right side of throat really hurting, has hx of salivary stones? she worked 16 hours last night and woke up evenn worse today and now has headache.         Sore throat:   Sore throat x 2 days: Right tonsil is swollen and tender; started yesterday, worse today- looks red and \"tiger stripes of whiteness on the tonsils\" right swollen gland under submandibular area, tender  Headache x 1 day- woke up this AM with pounding headache  Nauseated yesterday after dinner    She prefers to hold off on abx if able.     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 She is currently on a stretch off work.  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    Currently in a relationship, exercises periodically.  Social alcohol use.  Never smoker.    Susanna Colby MD       Medical History:   I have reviewed and updated the patient's Past Medical History, Social History, Family History and Medication List.    Medications:  Current Outpatient Medications   Medication Sig Dispense Refill     norgestimate-ethinyl estradiol (SPRINTEC, 28,) 0.25-35 mg-mcg per tablet Take 1 tablet by mouth daily. 3 Package 4     No current facility-administered medications for this visit.        Imaging & Labs reviewed this visit:   No results found for: HGBA1C  Lab Results   Component Value Date    TSH 2.41 08/28/2019     Lab Results   Component Value Date    CREATININE 0.83 08/28/2019     Lab Results   Component Value Date    K 4.3 " 08/28/2019         Objective:      There were no vitals filed for this visit.     Vitals taken at home: Temp has remained less than 100.4    Physical Exam:     General: Appears mildly fatigued, no acute distress.   HEET: normocephalic conjunctivae are clear, patient is able to palpate more swollen and tender right submandibular node  Mouth: very poor video quality despite great efforts that the patient to even use a flashlight to help show me the back of her throat, mild erythema of the right tonsillar area and hypertrophy is noted, challenging to appreciate any overlying exudate which she potentially is describing as above  Neck: supple   Lungs: Normal respiratory effort. No audible wheezing.   Heart: No visible JVD  Abdomen: comfortable during routine conversation without guarding   Skin: clear without rash or lesions  Neuro: alert    Susanna Colby MD      \

## 2021-06-10 NOTE — TELEPHONE ENCOUNTER
Question following Office Visit  When did you see your provider: Yesterday   What is your question: She received the negative strep test result.  She is taking the antibiotic and wondering if she can go ahead with it without it being harmful.  Her throat looks worse today. She does have a headache last night as well.  She took Tylenol and Advil last night to help with the symptoms.   She would like to continue the antibiotic.    Okay to leave a detailed message: Yes
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
## 2021-06-13 NOTE — TELEPHONE ENCOUNTER
RN cannot approve Refill Request    RN can NOT refill this medication RX was sent in to same pharmacy on 11/16/2020 for 100 pills and a refill. Last office visit: 1/9/2020 Susanna Colby MD Last Physical: 11/16/2020 Last MTM visit: Visit date not found Last visit same specialty: 1/9/2020 Susanna Colby MD.  Next visit within 3 mo: Visit date not found  Next physical within 3 mo: Visit date not found      Minerva Wilhelm, [facility] Triage/Med Refill 12/10/2020    Requested Prescriptions   Pending Prescriptions Disp Refills     hydrOXYzine HCL (ATARAX) 10 MG tablet [Pharmacy Med Name: HYDROXYZINE HCL 10 MG TABLET] 90 tablet 2     Sig: TAKE 1-3 TABLETS (10-30 MG TOTAL) BY MOUTH 3 (THREE) TIMES A DAY AS NEEDED FOR ANXIETY.       Antihistamine Refill Protocol Passed - 12/9/2020  1:32 PM        Passed - Patient has had office visit/physical in last year     Last office visit with prescriber/PCP: 1/9/2020 Susanna Colby MD OR same dept: 1/9/2020 Susanna Colby MD OR same specialty: 1/9/2020 Susanna Colby MD  Last physical: 11/16/2020 Last MTM visit: Visit date not found   Next visit within 3 mo: Visit date not found  Next physical within 3 mo: Visit date not found  Prescriber OR PCP: Susanna Colby MD  Last diagnosis associated with med order: 1. Situational anxiety  - hydrOXYzine HCL (ATARAX) 10 MG tablet [Pharmacy Med Name: HYDROXYZINE HCL 10 MG TABLET]; Take 1-3 tablets (10-30 mg total) by mouth 3 (three) times a day as needed for anxiety.  Dispense: 90 tablet; Refill: 2    If protocol passes may refill for 12 months if within 3 months of last provider visit (or a total of 15 months).

## 2021-06-14 NOTE — TELEPHONE ENCOUNTER
Who is calling:  patient  Reason for Call:  My not make 1st appt./virtual visit  Date of last appointment with primary care:12/22/2020  Okay to leave a detailed message: Yes
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
## 2021-06-14 NOTE — TELEPHONE ENCOUNTER
Refill Approved    Rx renewed per Medication Renewal Policy. Medication was last renewed on 1/9/20.    Ana Lilia Law, [facility] Triage/Med Refill 1/28/2021     Requested Prescriptions   Pending Prescriptions Disp Refills     SPRINTEC, 28, 0.25-35 mg-mcg per tablet [Pharmacy Med Name: SPRINTEC 28 DAY TABLET] 84 tablet 4     Sig: TAKE 1 TABLET BY MOUTH EVERY DAY       Oral Contraceptives Protocol Passed - 1/27/2021 12:10 PM        Passed - Visit with PCP or prescribing provider visit in last 12 months      Last office visit with prescriber/PCP: 12/22/2020 Susanna Colby MD OR same dept: 12/22/2020 Susanna Colby MD OR same specialty: 12/22/2020 Susanna Colby MD  Last physical: 11/16/2020 Last MTM visit: Visit date not found   Next visit within 3 mo: Visit date not found  Next physical within 3 mo: Visit date not found  Prescriber OR PCP: Susanna Colby MD  Last diagnosis associated with med order: 1. General counseling for prescription of oral contraceptives  - SPRINTEC, 28, 0.25-35 mg-mcg per tablet [Pharmacy Med Name: SPRINTEC 28 DAY TABLET]; TAKE 1 TABLET BY MOUTH EVERY DAY  Dispense: 84 tablet; Refill: 4    If protocol passes may refill for 12 months if within 3 months of last provider visit (or a total of 15 months).

## 2021-06-14 NOTE — PROGRESS NOTES
Shelby Colunga is a 30 y.o. female who is being evaluated via a billable video visit.      How would you like to obtain your AVS? MyChart.  If dropped from the video visit, the video invitation should be resent by: Text to cell phone: [phone]   Will anyone else be joining your video visit? No       Tanesha Pagan LPN

## 2021-06-15 NOTE — TELEPHONE ENCOUNTER
Refill Approved    Rx renewed per Medication Renewal Policy. Medication was last renewed on 11/16/20, last OV 1/27/21.    Sybil Rawls, [facility] Triage/Med Refill 2/20/2021     Requested Prescriptions   Pending Prescriptions Disp Refills     hydrOXYzine HCL (ATARAX) 10 MG tablet [Pharmacy Med Name: HYDROXYZINE HCL 10 MG TABLET] 90 tablet 2     Sig: TAKE 1-3 TABLETS (10-30 MG TOTAL) BY MOUTH 3 (THREE) TIMES A DAY AS NEEDED FOR ANXIETY.       Antihistamine Refill Protocol Passed - 2/20/2021 10:31 AM        Passed - Patient has had office visit/physical in last year     Last office visit with prescriber/PCP: 12/22/2020 Susanna Colby MD OR same dept: 12/22/2020 Susanna Colby MD OR same specialty: 12/22/2020 Susanna Colby MD  Last physical: 11/16/2020 Last MTM visit: Visit date not found   Next visit within 3 mo: Visit date not found  Next physical within 3 mo: Visit date not found  Prescriber OR PCP: Susanna Colby MD  Last diagnosis associated with med order: 1. Situational anxiety  - hydrOXYzine HCL (ATARAX) 10 MG tablet [Pharmacy Med Name: HYDROXYZINE HCL 10 MG TABLET]; Take 1-3 tablets (10-30 mg total) by mouth 3 (three) times a day as needed for anxiety.  Dispense: 90 tablet; Refill: 2    If protocol passes may refill for 12 months if within 3 months of last provider visit (or a total of 15 months).

## 2021-06-16 PROBLEM — F41.9 ANXIETY: Status: ACTIVE | Noted: 2018-05-11

## 2021-06-16 PROBLEM — L70.9 ACNE: Status: ACTIVE | Noted: 2018-05-11

## 2021-06-16 PROBLEM — G25.81 RESTLESS LEG SYNDROME: Status: ACTIVE | Noted: 2019-08-28

## 2021-06-17 NOTE — PATIENT INSTRUCTIONS - HE
"Patient Instructions by Susanna Colby MD at 8/28/2019 12:20 PM     Author: Susanna Colby MD Service: -- Author Type: Physician    Filed: 8/28/2019 12:54 PM Encounter Date: 8/28/2019 Status: Addendum    : Susanna Colby MD (Physician)    Related Notes: Original Note by Susanna Colby MD (Physician) filed at 8/28/2019 12:42 PM       MINDFULNESS    \"Mindfulness is about being fully awake in our lives. It is about perceiving the exquisite vividness of each moment.\"      - Mark Khan  Mindfulness-Based Stress Reduction (MBSR) is a blend of meditation, body awareness, and yoga:   learning through practice and study how your body handles (and can resolve) stress neurologically.     Mindfulness Resources (all are free):  1. \"Calm\" ashvin- free trial has guided 10min sessions on anxiety, gratitude, sleep, happiness, managing stress, etc.   2. \"Smiling Minds\" ashvin- short guided sessions for children and adults  3. \"Curable Pain Relief\" ashvin- for chronic pain  4. \"Insight Timer\" ashvin- free meditation timer with musical or bell tones, can also stream guided meditations    5. Free 8 week MBSR program online: [email or web address]    Iron foods:  You can increase your iron levels by eating more iron in your diet. Good sources of iron are actually iron-fortified cereals (like All Bran, or Cream of Wheat). Iron can be found in red meats such as beef, steak, liver; as well as almonds, lentil beans, chickpeas, green leafy vegetables like spinach, peaches, prune juice and raisins.       Restless Legs Syndrome: What You Can Do    Symptoms of restless leg syndrome (RLS) can be treated. Together, you and your healthcare provider can work on your treatment plan. If needed, medicines may be prescribed. Also learn what you can do to ease your discomfort. Good sleep habits and a healthy lifestyle will help you rest better at night and have more energy during the day.  Working with your healthcare " provider  RLS may occur on its own and may be passed on in families. It is sometimes linked to other medical problems. Low iron may cause some RLS symptoms. Your healthcare provider may order a lab test to check your iron level. Other medical problems associated with RLS are kidney disease, diabetes, Parkinson disease, and multiple sclerosis. Your doctor may prescribe medicines to reduce your symptoms and help you sleep better.  Tips for temporary relief  To reduce your discomfort, try the following:    Walking or stretching    Rubbing your legs    Having a massage    Taking a hot or cold bath    Doing activities that make muscles in your hands or legs work    Relaxing with yoga or meditation  Good sleep habits  Even though you have RLS, you can still have restful sleep. Try these good sleeping habits:    Keep a regular sleep schedule. Go to bed and get up at the same time each day.    Avoid or limit naps.    Make sure the bedroom is quiet, dark, and not too hot or too cold.    Use your bed only for sleep and sex.  Healthy lifestyle  Your lifestyle affects your health and your sleep. Here are some healthy habits:    Eat a balanced diet. To get enough vitamins and minerals, you may also need to take supplements.    Reduce caffeine     Manage stress and learn ways to relax. Deep breathing techniques and visualization can help to relax your muscles and calm your mind.    Exercise regularly. It can help reduce stress. Also, you will have more energy during the day and be more tired at bedtime. Afternoon exercise is best. Nighttime exercise may affect how well you sleep.  Date Last Reviewed: 9/1/2017 2000-2017 [employer]. [address] [place] [address], [place] [address]. All rights reserved. This information is not intended as a substitute for professional medical care. Always follow your healthcare professional's instructions.

## 2021-06-17 NOTE — PROGRESS NOTES
Shelby Colunga is a 27 y.o. female who is here for a health maintenance visit.    Shelby is a new patient to me.  She was previously seen Dr. Alexander at [facility].  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  Denies tobacco and drug use.  Endorses socially drinking alcohol.  Endorses in moderate exercise activity.  She is currently seeing a new male partner and has not been sexually active at this point.  She currently is on Mario daily and plans to use condoms while sexually active as well.   Denies any side effects of birth control pill, has been taking this longterm to treat her acne.   Denies any personal or family history of clotting, bleeding, thromboembolic disorders.   LMP 4/25 and regular    History of increased blood pressure intermittently.  Takes Propranolol intermittently for increased blood pressure and during anxiety symptoms.   Denies chest pain, numbness, tingling, shortness of breath, cough, and edema.    History of anxiety.  Not currently taking daily medication for her anxiety.  She states she was given the propranolol to help with her situational anxiety.  She states she feels this is mildly helpful.  She has not see a therapist or been on medication in the past.  Denies any thoughts of  harming herself or others.   Tries to manage anxiety with exercise and healthy lifestyle.     An acute concern notes vaginal drainage and spotting after she is with her boyfriend.  She has not been sexually active only when he uses his hands, she notes spotting after  Denies any pelvic pain, itching, burning odorous drainage.   Denies any pain.     Reviewed past medical/surgical history, family history, social history, medications and updated chart below.     No Known Allergies  Current Outpatient Prescriptions   Medication Sig Dispense Refill     drospirenone-ethinyl estradiol (MARIO) 3-0.02 mg per tablet TAKE ONE TABLET BY  "MOUTH ONE TIME DAILY       propranolol (INDERAL) 10 MG tablet Take 10 mg by mouth 3 (three) times a day.       No current facility-administered medications for this visit.      Past Medical History:   Diagnosis Date     Anxiety      Past Surgical History:   Procedure Laterality Date     HIP SURGERY       WISDOM TOOTH EXTRACTION       Social History     Social History     Marital status: Single     Spouse name: N/A     Number of children: N/A     Years of education: N/A     Social History Main Topics     Smoking status: Never Smoker     Smokeless tobacco: Never Used     Alcohol use 1.2 oz/week     2 Standard drinks or equivalent per week      Comment: 2/week     Drug use: No     Sexual activity: Yes     Partners: Male     Birth control/ protection: Pill     Other Topics Concern     None     Social History Narrative     Family History   Problem Relation Age of Onset     Hypertension Mother      Rheum arthritis Mother      Atrial fibrillation Father          Review of Systems   Constitutional: Negative.   HENT: Negative.   Eyes: Negative.   Respiratory: Negative.   Cardiovascular: Negative.   Gastrointestinal: Negative.   Endocrine: Negative.   Genitourinary: spotting.   Musculoskeletal: Negative.   Skin: Negative.   Allergic/Immunologic: Negative.   Neurological: Negative.   Hematological: Negative.   Psychiatric/Behavioral: Hx of anxiety      Objective:     Physical Exam   /90 (Patient Site: Left Arm, Patient Position: Sitting, Cuff Size: Adult Regular)  Pulse 64  Ht 5' 5\" (1.651 m)  Wt 126 lb (57.2 kg)  LMP 04/25/2018 (Approximate)  BMI 20.97 kg/m2    Constitutional: Alert and oriented x3, well nourished without any acute distress  HENT:   Right Ear: External ear normal.   Left Ear: External ear normal.   Nose: Nose normal.   Mouth/Throat: Oropharynx is clear and moist.   Eyes: Conjunctivae and EOM are normal. Pupils are equal, round, and reactive to light. Right eye exhibits no discharge. Left eye " exhibits no discharge.   Neck: No thyromegaly present.   Lymphadenopathy: Without palpable lymphadenopathy  Cardiovascular: Normal S1 and S2. Regular rate, rhythm and no murmur, rubs or gallops. Palpable distal pulses bilaterally.  Pulmonary/Chest: Normal effort. Lungs clear to auscultation in all lobes. Without wheezing, rhonchi or crackles.    Abdominal: Soft, non tenderness. There is no rebound or guarding. Bowel sounds x4. Without organomegaly. No CVA tenderness.  Musculoskeletal: 5/5 strength  And full ROM in all extremities. No joint swelling or deformity  Neurological: Cranial nerves intact, without deficit. Without numbness, tingling or paresthesia. Normal reflexes  Skin: Dry and intact; without rashes or lesions.   Psychiatric: Normal mood and affect.   : External female genitalia without lesions. Introitus is normal, vaginal walls pink and moist without lesions. There is no cervical motion tenderness and the adnexa are without masses. There is no abnormal discharge from the cervix.   Breast: No chest deformity, asymmetry. Normal contours. Without nodules, masses, tenderness, or axillary adenopathy. No nipple discharge or dimpling noted.     1. Health care maintenance  I will order fasting labs today, along with c/g and follow up with results once received.   We discussed healthy lifestyle, nutrition, cardiovascular risk reduction, self care, safety, self breast exams, sunscreen, and seatbelt screening.  Recommended annual physical exam or sooner for any acute concerns.   Patient agreed and appeared pleased with plan    - Lipid Cascade  - Comprehensive Metabolic Panel  - HM2(CBC w/o Differential)  - Thyroid Cascade  - Vitamin D, Total (25-Hydroxy)  - Chlamydia trachomatis & Neisseria gonorrhoeae, Amplified Detection    2. Acne  Referral placed to dermatology for evaluation of acne.   Consider stopping Belkis and different option to treat birth control and acne  D/t elevated BP.   - Ambulatory referral to  "Dermatology    3. Anxiety  KIN 7 is 6 and PHQ 9 is 4  Declined medication today  long discussion of options to treat anxiety, recommended Lexapro  She would like to hold  Will check thyroid and labs above  Home supportive care and healthy lifestyle changes discussed, regular exercise, healthy diet, meditation.     4. Vaginal discharge  Wet prep c/g completed today  I will follow up with results once received.   - Wet Prep, Vaginal    5. Elevated blood pressure without the diagnosis of hypertension  Vitals:    05/11/18 1209 05/11/18 1300   BP: (!) 136/92 126/90   Patient Site: Left Arm Left Arm   Patient Position: Sitting Sitting   Cuff Size: Adult Regular Adult Regular   Pulse: 64    Weight: 126 lb (57.2 kg)    Height: 5' 5\" (1.651 m)      She did decline any medication for anxiety and blood pressure today.   I will check labs above and follow up with results once received.   For hypertension discussed checking blood pressure same time daily and place in log.   Recommended seated for 10 minutes prior to taking blood pressure.   Also discussed lifestyle changes, decreasing salt intake to no more than 2 grams per day and increasing exercise.  Follow up and bring log with for review.   Also bring in home blood pressure cuff at next office visit to make sure it correlates with office reading.  Follow up in any questions or concern. Patient agreed with plan!    Follow up in 1-2 weeks to touch base with blood pressure, anxiety. Shelby did agree with plan  Office visit and charting completed with Susanna Ferrer, RENATO student    Coty Santamaria, ANNIE  [facility]      "

## 2021-06-18 NOTE — PATIENT INSTRUCTIONS - HE
Patient Instructions by Susanna Colby MD at 5/11/2020 10:54 AM     Author: Susanna Colby MD Service: -- Author Type: Physician    Filed: 5/11/2020 10:54 AM Encounter Date: 5/10/2020 Status: Signed    : Susanna Colby MD (Physician)           Urinary Tract Infections in Women    Urinary tract infections (UTIs) are most often caused by bacteria. These bacteria enter the urinary tract. The bacteria may come from inside the body. Or they may travel from the skin outside the rectum or vagina into the urethra. Female anatomy makes it easy for bacteria from the bowel to enter a womans urinary tract, which is the most common source of UTI. This means women develop UTIs more often than men. Pain in or around the urinary tract is a common UTI symptom. But the only way to know for sure if you have a UTI for the healthcare provider to test your urine. The two tests that may be done are the urinalysis and urine culture.  Types of UTIs    Cystitis. A bladder infection (cystitis) is the most common UTI in women. You may have urgent or frequent need to pee. You may also have pain, burning when you pee, and bloody urine.    Urethritis. This is an inflamed urethra, which is the tube that carries urine from the bladder to outside the body. You may have lower stomach or back pain. You may also have urgent or frequent need to pee.    Pyelonephritis. This is a kidney infection. If not treated, it can be serious and damage your kidneys. In severe cases, you may need to stay in the hospital. You may have a fever and lower back pain.    Medicines to treat a UTI  Most UTIs are treated with antibiotics. These kill the bacteria. The length of time you need to take them depends on the type of infection. It may be as short as 3 days. If you have repeated UTIs, you may need a low-dose antibiotic for several months. Take antibiotics exactly as directed. Dont stop taking them until all of the medicine is gone. If you  stop taking the antibiotic too soon, the infection may not go away. You may also develop a resistance to the antibiotic. This can make it much harder to treat.  Lifestyle changes to treat and prevent UTIs  The lifestyle changes below will help get rid of your UTI. They may also help prevent future UTIs.    Drink plenty of fluids. This includes water, juice, or other caffeine-free drinks. Fluids help flush bacteria out of your body.    Empty your bladder. Always empty your bladder when you feel the urge to pee. And always pee before going to sleep. Urine that stays in your bladder can lead to infection. Try to pee before and after sex as well.    Practice good personal hygiene. Wipe yourself from front to back after using the toilet. This helps keep bacteria from getting into the urethra.    Use condoms during sex. These help prevent UTIs caused by sexually transmitted bacteria. Also don't use spermicides during sex. These can increase the risk for UTIs. Choose other forms of birth control instead. For women who tend to get UTIs after sex, a low-dose of a preventive antibiotic may be used. Be sure to discuss this option with your healthcare provider.    Follow up with your healthcare provider as directed. He or she may test to make sure the infection has cleared. If needed, more treatment may be started.  Date Last Reviewed: 7/1/2019 2000-2019 [employer]. [address] [place] [address], [place] [address]. All rights reserved. This information is not intended as a substitute for professional medical care. Always follow your healthcare professional's instructions.        Based on the information that you have provided, I have placed an order for you to start treatment.  View your full visit summary for details. Click on the link below to access your visit summary.    Your pharmacist will address any questions you may have about taking the medication.    We sent it to a [employer] at [facility] in [place], [place]:   [id number]    [place] [facility] [address]    Store number: [id number]

## 2021-06-20 NOTE — LETTER
Letter by Susanna Colby MD at      Author: Susanna Colby MD Service: -- Author Type: --    Filed:  Encounter Date: 8/25/2020 Status: (Other)       8/25/2020      Shelby Colunga  [address]  [facility] [address]          2019-nCOV (no units)   Date Value   08/24/2020 Not Detected        No results found for: [id number]    This letter provides a written record that you were tested for COVID-19.    Your result was negative. This means that we didnt find the virus that causes COVID-19 in your sample. A test may show negative when you do actually have the virus. This can happen when the virus is in the early stages of infection, before you feel illness symptoms.    If you have symptoms   Stay home and away from others (self-isolate) until you meet ALL of the guidelines below:    Youve had no fever--and no medicine that reduces fever--for 1 full day (24 hours). And ?    Your other symptoms have gotten better. For example, your cough or breathing has improved. And?    At least 10 days have passed since your symptoms started. (If you've been told by a doctor that you have a weak immune system, wait 20 days.)    During this time:    Stay home. Dont go to work, school or anywhere else.     Stay in your own room, including for meals. Use your own bathroom if you can.    Stay away from others in your home. No hugging, kissing or shaking hands. No visitors.    Clean high touch surfaces often (doorknobs, counters, handles, etc.). Use a household cleaning spray or wipes. You can find a full list on the EPA website at www.epa.gov/pesticide-registration/list-n-disinfectants-use-against-sars-cov-2.    Cover your mouth and nose with a mask or other face covering to avoid spreading germs.    Wash your hands and face often with soap and water.    Going back to work  Check with your employer for any guidelines to follow for going back to work.    Employers: This document serves as formal notice that your employee  tested negative for COVID-19, as of the testing date shown above.

## 2021-06-27 NOTE — PROGRESS NOTES
Progress Notes by Susanna Colby MD at 8/28/2019 12:20 PM     Author: Susanna Colby MD Service: -- Author Type: Physician    Filed: 8/28/2019  8:08 PM Encounter Date: 8/28/2019 Status: Signed    : Susanna Colby MD (Physician)       FEMALE PREVENTATIVE EXAM    Assessment and Plan:       1. Routine general medical examination at a health care facility  Overall doing well, and some anxiety and restless legs which were discussed today in further detail.  - HM2(CBC w/o Differential)  - Basic Metabolic Panel  - Thyroid Stimulating Hormone (TSH)  - Lipid Cascade  - HIV Antigen/Antibody Screening Leslie  - Tdap vaccine greater than or equal to 8yo IM  - Chlamydia trachomatis & Neisseria gonorrhoeae, Amplified Detection    2. Restless leg syndrome  We reviewed the natural course of restless legs and today we will obtain basic labs to rule out potential contributing causes.  Reviewed importance of limiting caffeine, getting good sleep and increasing physical activity as well as other dietary and lifestyle interventions to try if she is experiencing symptoms.  At this point she and I both agree to hold off on medication therapy.  - Ferritin  - HM2(CBC w/o Differential)  - Basic Metabolic Panel  - Thyroid Stimulating Hormone (TSH)    3. General counseling for prescription of oral contraceptives  Reviewed range of contraceptive options, from OCPs, the patch, Depo-provera, to hormonal vs nonhormonal IUD and nexplanon, as well as various options for natural family planning or surgical. Reviewed typical effectiveness of each as well as side effect profiles of these options, including effects on spotting, nausea/ headaches, and emotional lability. Pt elects for continuation of her OCP. Good candidate for this, with no h/o liver or clotting or kidney issues. Nonsmoker.   - drospirenone-ethinyl estradiol (GIANVI, 28,) 3-0.02 mg per tablet; Take 1 tablet by mouth daily.  Dispense: 84 tablet; Refill:  "3    4. Anxiety  Overall feels this is well controlled, jose 7 today is 6.  Declines opportunity for counseling.  We reviewed exercise, good sleep, and mindfulness.  - Thyroid Stimulating Hormone (TSH)    5.Screening for cervical cancer  - Gynecologic Cytology (PAP Smear)     Next follow up:  Return in about 1 year (around 8/28/2020) for Annual physical, or sooner if symptoms not improving.    Immunization Review  Adult Imm Review: Due today, orders placed    I discussed the following with the patient:   Adult Healthy Living: Importance of regular exercise  Healthy nutrition  Getting adequate sleep  Stress management  Use of seat belts  Distracted driving  STI prevention  Contraception options  Supplement use      Subjective:   Chief Complaint: Shelby Colunga is an 29 y.o. female here for a preventative health visit.     HPI:    Chief Complaint   Patient presents with   ? Establish Care     tips for restless legs- discuss   ? Annual Exam     fasting, pap     On an OCP for birth control- loves this for her acne especially, otherwise no concerns  Anxiety: not on any meds; was on propranolol 10mg 5 years ago but not needing this any longer.  She does work 3 jobs and is currently in an new relationship.  She feels these are going well.  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 Noticing sx 1x/week for the last few months. Poor sleep recently because working a double shift. Feels sx are worse if having poor sleep. Sx also as she is trying to fall asleep. Tried massage, pacing, lavender, melatonin.     Thinking about starting a \"beach body\" exercise routine but has not started exercising on a regular basis yet.    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 " "   Currently in a relationship, exercises periodically.  Social alcohol use.  Never smoker.    Susanna Colby MD       Healthy Habits  Are you taking a daily aspirin? No  Do you typically exercising at least 40 min, 3-4 times per week?  NO  Do you usually eat at least 4 servings of fruit and vegetables a day, include whole grains and fiber and avoid regularly eating high fat foods? NO  Have you had an eye exam in the past two years? Yes  Do you see a dentist twice per year? Yes  Do you have any concerns regarding sleep? YES    Safety Screen  If you own firearms, are they secured in a locked gun cabinet or with trigger locks? The patient does not own any firearms  Do you feel you are safe where you are living?: Yes (8/28/2019 12:31 PM)  Do you feel you are safe in your relationship(s)?: Yes (8/28/2019 12:31 PM)      Review of Systems:  Please see above.  The rest of the review of systems are negative for all systems.       Cancer Screening       Status Date      PAP SMEAR Overdue 8/28/2012           Patient Care Team:  Coty Santamaria NP as PCP - General (Family Medicine)        History     Reviewed By Date/Time Sections Reviewed    Susanna Colby MD 8/28/2019  1:07 PM Medical, Surgical, Tobacco, Family    Tanesha Pagan LPN 8/28/2019 12:31 PM Tobacco            Objective:   Vital Signs:   Visit Vitals  /82 (Patient Site: Left Arm, Patient Position: Sitting, Cuff Size: Adult Regular)   Pulse 68   Ht 5' 5\" (1.651 m)   Wt 133 lb 4.8 oz (60.5 kg)   LMP 08/05/2019   BMI 22.18 kg/m           PHYSICAL EXAM  Constitutional: Patient is oriented to person, place, and time. Patient appears well-developed and well-nourished. No distress.   Head: Normocephalic and atraumatic.   Ears: External ear and TMs normal bilaterally.  Nose: Nose normal.   Mouth/Throat: Oropharynx is clear and moist. No oropharyngeal exudate.   Eyes: Conjunctivae and EOM are normal. Pupils are equal, round, and reactive to light. No " discharge. No scleral icterus.   Neck: Neck supple. No JVD present. No tracheal deviation present. No thyromegaly present.  Breasts: Normal appearing, no skin changes, no palpable mass, no tenderness on palpation.  No axillary involvement  Cardiovascular: Normal rate, regular rhythm, normal heart sounds and intact distal pulses. No murmur heard.   Pulmonary/Chest: Effort normal and breath sounds normal. Patient has no wheezes, no rales, exhibits no tenderness.   Abdominal: Soft. Bowel sounds are normal. No masses. There is no tenderness.   Lymphadenopathy:  Patient has no cervical adenopathy.   Neurological: Patient is alert and oriented to person, place, and time. Patient has normal reflexes. No cranial nerve deficit. Coordination normal.   Skin: Skin is warm and dry. No rash noted. No pallor.   Pelvic: Normal external genitalia with Normal vulva.  Normal vagina with no polyps or lesions and with physiologic discharge.  Normal cervix with normal mucosa and without CMT.  No adnexal masses  Psychiatric: Patient has good eye contact without any psychomotor retardation or stereotypic behaviors.  normal mood and affect. Judgment and thought content normal.   Speech is regular rate and rhythm.              Medication List           Accurate as of 8/28/19  1:09 PM. If you have any questions, ask your nurse or doctor.               CONTINUE taking these medications    drospirenone-ethinyl estradiol 3-0.02 mg per tablet  Also known as:  DI (28)  INSTRUCTIONS:  Take 1 tablet by mouth daily.           STOP taking these medications    propranolol 10 MG tablet  Also known as:  INDERAL  Stopped by:  Susanna Colby MD           Where to Get Your Medications      These medications were sent to [facility] [address] [place] [facility] [address] [address]  [address] [address]    Phone:  [phone]     drospirenone-ethinyl estradiol 3-0.02 mg per tablet         Additional Screenings Completed Today:    PHQ9 is 5  GAD7 is 6

## 2021-06-29 NOTE — PROGRESS NOTES
Progress Notes by Susanna Colby MD at 5/11/2020 10:54 AM     Author: Susanna Colby MD Service: -- Author Type: Physician    Filed: 5/11/2020 10:55 AM Encounter Date: 5/10/2020 Status: Signed    : Susanna Colby MD (Physician)         Assessment:   The encounter diagnosis was Acute UTI (urinary tract infection).     Plan:     Medications Ordered   Medications   ? nitrofurantoin, macrocrystal-monohydrate, (MACROBID) 100 MG capsule     Sig: Take 1 capsule (100 mg total) by mouth 2 (two) times a day for 5 days.     Dispense:  10 capsule     Refill:  0     Patient Instructions         Urinary Tract Infections in Women    Urinary tract infections (UTIs) are most often caused by bacteria. These bacteria enter the urinary tract. The bacteria may come from inside the body. Or they may travel from the skin outside the rectum or vagina into the urethra. Female anatomy makes it easy for bacteria from the bowel to enter a womans urinary tract, which is the most common source of UTI. This means women develop UTIs more often than men. Pain in or around the urinary tract is a common UTI symptom. But the only way to know for sure if you have a UTI for the healthcare provider to test your urine. The two tests that may be done are the urinalysis and urine culture.  Types of UTIs    Cystitis. A bladder infection (cystitis) is the most common UTI in women. You may have urgent or frequent need to pee. You may also have pain, burning when you pee, and bloody urine.    Urethritis. This is an inflamed urethra, which is the tube that carries urine from the bladder to outside the body. You may have lower stomach or back pain. You may also have urgent or frequent need to pee.    Pyelonephritis. This is a kidney infection. If not treated, it can be serious and damage your kidneys. In severe cases, you may need to stay in the hospital. You may have a fever and lower back pain.    Medicines to treat a UTI  Most UTIs  are treated with antibiotics. These kill the bacteria. The length of time you need to take them depends on the type of infection. It may be as short as 3 days. If you have repeated UTIs, you may need a low-dose antibiotic for several months. Take antibiotics exactly as directed. Dont stop taking them until all of the medicine is gone. If you stop taking the antibiotic too soon, the infection may not go away. You may also develop a resistance to the antibiotic. This can make it much harder to treat.  Lifestyle changes to treat and prevent UTIs  The lifestyle changes below will help get rid of your UTI. They may also help prevent future UTIs.    Drink plenty of fluids. This includes water, juice, or other caffeine-free drinks. Fluids help flush bacteria out of your body.    Empty your bladder. Always empty your bladder when you feel the urge to pee. And always pee before going to sleep. Urine that stays in your bladder can lead to infection. Try to pee before and after sex as well.    Practice good personal hygiene. Wipe yourself from front to back after using the toilet. This helps keep bacteria from getting into the urethra.    Use condoms during sex. These help prevent UTIs caused by sexually transmitted bacteria. Also don't use spermicides during sex. These can increase the risk for UTIs. Choose other forms of birth control instead. For women who tend to get UTIs after sex, a low-dose of a preventive antibiotic may be used. Be sure to discuss this option with your healthcare provider.    Follow up with your healthcare provider as directed. He or she may test to make sure the infection has cleared. If needed, more treatment may be started.  Date Last Reviewed: 7/1/2019 2000-2019 [employer]. [address] [place] [address], [place] [address]. All rights reserved. This information is not intended as a substitute for professional medical care. Always follow your healthcare professional's  instructions.        Based on the information that you have provided, I have placed an order for you to start treatment.  View your full visit summary for details. Click on the link below to access your visit summary.    Your pharmacist will address any questions you may have about taking the medication.    We sent it to a [facility] in [place], [place]:   [address]    [place] [address]    Store number: [id number]          Return for further follow up if needed. Call [phone]) or schedule an appointment via [employer]..    Subjective:   Shelby Colunga is a 29 y.o. female who submitted an eVisit request for evaluation of her Dysuria.  See the questionnaire and message section of encounter report for information related to history of present illness and review of systems.    The following portions of the patient's history were reviewed and updated as appropriate:  She does not have any pertinent problems on file.  She has No Known Allergies..     Objective:   No exam performed today, patient submitted as eVisit.    Provider E-Visit time total (minutes): 5 min
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
## 2021-10-04 ENCOUNTER — HEALTH MAINTENANCE LETTER (OUTPATIENT)
Age: 31
End: 2021-10-04

## 2022-01-23 ENCOUNTER — HEALTH MAINTENANCE LETTER (OUTPATIENT)
Age: 32
End: 2022-01-23

## 2022-09-11 ENCOUNTER — HEALTH MAINTENANCE LETTER (OUTPATIENT)
Age: 32
End: 2022-09-11

## 2023-04-30 ENCOUNTER — HEALTH MAINTENANCE LETTER (OUTPATIENT)
Age: 33
End: 2023-04-30